# Patient Record
Sex: MALE | URBAN - METROPOLITAN AREA
[De-identification: names, ages, dates, MRNs, and addresses within clinical notes are randomized per-mention and may not be internally consistent; named-entity substitution may affect disease eponyms.]

---

## 2018-04-22 ENCOUNTER — HOSPITAL ENCOUNTER (EMERGENCY)
Facility: CLINIC | Age: 23
Discharge: HOME | End: 2018-04-22
Attending: EMERGENCY MEDICINE | Admitting: EMERGENCY MEDICINE
Payer: COMMERCIAL

## 2018-04-22 VITALS
DIASTOLIC BLOOD PRESSURE: 72 MMHG | OXYGEN SATURATION: 98 % | HEIGHT: 65 IN | SYSTOLIC BLOOD PRESSURE: 152 MMHG | BODY MASS INDEX: 30.32 KG/M2 | WEIGHT: 182 LBS | TEMPERATURE: 99.5 F | RESPIRATION RATE: 16 BRPM

## 2018-04-22 DIAGNOSIS — L05.02 PILONIDAL SINUS WITH ABSCESS: ICD-10-CM

## 2018-04-22 PROCEDURE — 99284 EMERGENCY DEPT VISIT MOD MDM: CPT | Mod: 25

## 2018-04-22 PROCEDURE — 87070 CULTURE OTHR SPECIMN AEROBIC: CPT | Performed by: EMERGENCY MEDICINE

## 2018-04-22 PROCEDURE — 10080 I&D PILONIDAL CYST SIMPLE: CPT

## 2018-04-22 PROCEDURE — 76857 US EXAM PELVIC LIMITED: CPT

## 2018-04-22 RX ORDER — CLINDAMYCIN HCL 300 MG
300 CAPSULE ORAL 4 TIMES DAILY
Qty: 40 CAPSULE | Refills: 0 | Status: SHIPPED | OUTPATIENT
Start: 2018-04-22 | End: 2018-05-02

## 2018-04-22 RX ORDER — IBUPROFEN 600 MG/1
600 TABLET, FILM COATED ORAL EVERY 6 HOURS PRN
Qty: 60 TABLET | Refills: 0 | Status: ON HOLD | OUTPATIENT
Start: 2018-04-22 | End: 2018-05-09

## 2018-04-22 RX ORDER — ETHYL CHLORIDE 100 %
1 AEROSOL, SPRAY (ML) TOPICAL ONCE
Status: DISCONTINUED | OUTPATIENT
Start: 2018-04-22 | End: 2018-04-22 | Stop reason: HOSPADM

## 2018-04-22 ASSESSMENT — ENCOUNTER SYMPTOMS
WOUND: 1
COLOR CHANGE: 0

## 2018-04-22 NOTE — ED AVS SNAPSHOT
Emergency Department    [address] [facility] [address]    Phone:  [phone]    Fax:  [phone]                                       Marilyn Perez   MRN: [id number]    Department:   Emergency Department   Date of Visit:  4/22/2018           Patient Information     Date Of Birth          1995        Your diagnoses for this visit were:     Pilonidal sinus with abscess        You were seen by Nhi Veras MD.      Follow-up Information     Follow up with [facility]. Schedule an appointment as soon as possible for a visit in 1 week.    Contact information:    [address]  [facility] [address] [phone]        Discharge Instructions       *You may resume diet and activities.  *Sitz baths several times daily. Take medications as prescribed.  Ibuprofen and/or tylenol for pain.  Clindamycin only if surrounding redness or warmth begins and spreads. Continue your current medications  *Follow-up with General Surgery for a recheck and for possible surgery to remove the pilonidal cyst and sinus.  *Return if you develop fever, spreading redness or warmth, faint or feel like you will faint or become worse in any way.      Infected Pilonidal Cyst (Incision & Drainage)  A pilonidal cyst is a swelling that starts under the skin on the sacrum near the tail bone. It may look like a small dimple. It can fill with skin oils, hair, and dead skin cells. It may stay small or grow larger. Because it often has an opening to the surface, it may become infected with normal skin bacteria.  Cause  The cause of pilonidal cysts has been debated since they were first recognized. It may be present at birth and go unnoticed. Injury, rubbing, or skin irritation may also cause pilonidal cysts. It can also be caused by an ingrown hair. Most likely, the cause is a combination of these things. Because some injury or irritation can lead to pilonidal cysts, it can be more  common in people who sit or drive a lot for work.  Symptoms  A pilonidal cyst may be small and painless. If it is inflamed or infected, you may have these symptoms:    Swelling    Irritation or redness    Pain    Drainage  The cyst can swell and drain on its own. The swelling and drainage can come and go.  Treatment  Your pilonidal cyst was drained with a small incision using local anesthesia.  After the incision and drainage, gauze packing may be inserted into the opening. If so, it should be removed in 1 to 2 days. Antibiotics are not required in the treatment of a simple abscess, unless the infection is spreading into the skin around the wound. The wound will take about 1 to 2 weeks to heal depending on the size of the cyst.  Home care  Wound care    Pus may drain from the wound for the first few days. Cover the wound with a clean dry bandage. Change the bandage if it becomes soaked with blood or pus, or if it gets soiled with feces or urine.    Sit in a tub filled with about 6 inches of hot water. Keep the water hot for 10 to15 minutes.    If gauze packing was placed inside the cyst cavity, you may be told to remove it yourself. You may do this in the shower. Once the packing is removed, you should wash the area carefully in the shower once a day. Do this until the skin opening has closed. It is okay to direct the shower spray directly into the opening if this is not too painful.  Medicines    Take acetaminophen or ibuprofen for pain, unless you were given a different pain medicine to use. Talk with your doctor before using these medicines if you have chronic liver or kidney disease or have ever had a stomach ulcer or gastrointestinal bleeding. Also talk with your doctor if you are taking blood thinner medicines.    If you were given antibiotics, take them until they are gone. It is important to finish the antibiotics even if the wound looks better. This is to make sure the infection has cleared.    Use  antibiotic cream or ointment if your healthcare provider tells you to do so.  Prevention  Once this infection has healed, the following may decrease the risk of future infections:    Keep the area of the cyst clean by bathing or showering daily.    Avoid tight-fitting clothing to minimize perspiration and irritation of the skin.    Recurrent pilonidal cysts may be completely removed by surgery. But this can only be done at a time when there is no infection. Ask your doctor for more information.  Follow-up care  Follow up with your healthcare provider, or as advised. If a gauze packing was inserted in your wound, it should be removed in 1 to 2 days. Check your wound every day for the signs listed below.  When to seek medical advice  Call your healthcare provider right away if any of these occur:    Pus continues to come from the cyst for 5 days after the incision    Increasing redness, local pain, or swelling    Fever of 100.4 F (38.0 C) or higher for more than 2 days, or as directed by your healthcare provider  Date Last Reviewed: 12/1/2016 2000-2017 [employer]. [address] [place] [address]. All rights reserved. This information is not intended as a substitute for professional medical care. Always follow your healthcare professional's instructions.        Discharge Instructions  Boils or Abscesses, MRSA Skin infections    You have been treated today for a skin boil or abscess. A boil is an infection under the skin that causes a painful pus filled lump. Boils start when bacteria infect a hair follicle, the place where a hair starts to grow.  The most common places that boils develop are on the face, neck, armpits, breasts, groin and buttocks. When they are small they can often be treated at home, but they can grow quickly, become very painful, and require medical attention.    Many of these infections are staph infections. Staph is a type of bacteria that commonly lives on skin. As many  as 1 out of 3 people have staph that lives on their skin. Usually, it causes no problems, but if there is a cut or scrape, it can cause an infection. You have been treated today for an infection thought to be caused by MRSA, (pronounced  mursa ) which stands for methicillin resistant staph aureus. MRSA can be very difficult to treat. The antibiotics that were once used for skin infections do not work on MRSA, so alternative medications must be prescribed.    Return to the Emergency Department if:    Your redness, pain, or swelling gets a lot worse.    You are unable to get your antibiotics, or are vomiting them up or you can t take them.    You are feeling more ill, weak or lightheaded.    You start to run a new fever (temperature >101).    Anything else about the infection worries or concerns you.  Treatment:    Incision and drainage (opening the boil with a scalpel to help the pus drain) or needle aspiration (removing pus with a syringe) is sometimes needed for larger abscesses. A wick or packing is sometimes put in the wound to encourage ongoing drainage of infection from the area.  Please leave it in place for as long as instructed by your care provider or until your follow up wound check in 48 hours.    Start your antibiotics right away, and take them as prescribed. Be sure to finish the whole prescription, even if you are better.    Apply a heating pad, warm packs, or warm water soaks to the infected area for 15 minutes at a time, at least 3 times a day. Do not use a heating pad on your feet or legs if you have diabetes. Do not sleep with a heating pad on, since this can cause burns or skin injury.    Raise the affected area above the level of your heart as much as possible in the first 1-2 days.    Pain medication - You may take a pain medication such as Tylenol  (acetaminophen), Advil  (ibuprofen), Nuprin  (ibuprofen) or Aleve  (naproxen).  If you have been given a narcotic such as Vicodin  (hydrocodone with  acetaminophen), Percocet  (oxycodone with acetaminophen), or codeine, do not drive for four hours after you have taken it. If the narcotic contains Tylenol  (acetaminophen), do not take Tylenol  with it. All narcotics will cause constipation, so eat a high fiber diet.              Information about MRSA    How do you catch MRSA?    By touching a person who has MRSA on his or her skin.    By being nearby when a person with MRSA breathes, coughs, or sneezes.    By touching a table, handle or other surface that has the germ on it.    If the germ is on your skin and you cut yourself or have another injury, you can get infected.    How do I know if I have a MRSA infection? MRSA most commonly causes skin infections such as boils, red tender lumps that contain pus. Your physician may recognize MRSA from the appearance of your infection. Sometimes, your doctor may swab your skin or the drainage from a boil to test for MRSA or other bacteria.    Can MRSA be treated? Yes, certain medications are still effective in treating MRSA infections.  It is very important that you follow the directions exactly. Take ALL the pills you are given, even if you feel better before you finish the pills. If you do not take them all, the germ could come back even stronger and be harder to treat next time.  Is there any way to prevent MRSA?     Wash your hands frequently with soap and water.    Do not share towels, washcloths, razors or other personal care items.    Wipe down gym equipment before and after you use it.    If you develop a similar infection in the future, you can try to treat it at home:    Apply warm compresses to the affected area to promote drainage.    Wash hands frequently to prevent spread of infection.    Keep affected area covered to prevent spread of infection.    Never squeeze or pop boils.     When to seek medical attention for boils:    You develop a fever.    The area around the boil becomes red or red streaks  "develop.    Your pain becomes severe.    You develop swollen lymph nodes.    You have diabetes, a heart murmur, an immune disease like HIV or AIDS, you take corticosteroids for a medical condition, or you are on chemotherapy.    You develop a boil or abscess on your face, near your spine or near your rectal opening.  Probiotics: If you have been given an antibiotic, you may want to also take a probiotic pill or eat yogurt with live cultures. Probiotics have \"good bacteria\" to help your intestines stay healthy. Studies have shown that probiotics help prevent diarrhea and other intestine problems (including C. diff infection) when you take antibiotics. You can buy these without a prescription in the pharmacy section of the store.   If you were given a prescription for medicine here today, be sure to read all of the information (including the package insert) that comes with your prescription.  This will include important information about the medicine, its side effects, and any warnings that you need to know about.  The pharmacist who fills the prescription can provide more information and answer questions you may have about the medicine.  If you have questions or concerns that the pharmacist cannot address, please call or return to the Emergency Department.   Opioid Medication Information    Pain medications are among the most commonly prescribed medicines, so we are including this information for all our patients. If you did not receive pain medication or get a prescription for pain medicine, you can ignore it.     You may have been given a prescription for an opioid (narcotic) pain medicine and/or have received a pain medicine while here in the Emergency Department. These medicines can make you drowsy or impaired. You must not drive, operate dangerous equipment, or engage in any other dangerous activities while taking these medications. If you drive while taking these medications, you could be arrested for DUI, or " driving under the influence. Do not drink any alcohol while you are taking these medications.     Opioid pain medications can cause addiction. If you have a history of chemical dependency of any type, you are at a higher risk of becoming addicted to pain medications.  Only take these prescribed medications to treat your pain when all other options have been tried. Take it for as short a time and as few doses as possible. Store your pain pills in a secure place, as they are frequently stolen and provide a dangerous opportunity for children or visitors in your house to start abusing these powerful medications. We will not replace any lost or stolen medicine.  As soon as your pain is better, you should flush all your remaining medication.     Many prescription pain medications contain Tylenol  (acetaminophen), including Vicodin , Tylenol #3 , Norco , Lortab , and Percocet .  You should not take any extra pills of Tylenol  if you are using these prescription medications or you can get very sick.  Do not ever take more than 3000 mg of acetaminophen in any 24 hour period.    All opioids tend to cause constipation. Drink plenty of water and eat foods that have a lot of fiber, such as fruits, vegetables, prune juice, apple juice and high fiber cereal.  Take a laxative if you don t move your bowels at least every other day. Miralax , Milk of Magnesia, Colace , or Senna  can be used to keep you regular.      Remember that you can always come back to the Emergency Department if you are not able to see your regular doctor in the amount of time listed above, if you get any new symptoms, or if there is anything that worries you.        24 Hour Appointment Hotline       To make an appointment at any [facility], call [phone] ([phone]). If you don't have a family doctor or clinic, we will help you find one. [facility] are conveniently located to serve the needs of you and your family.             Review of  your medicines      START taking        Dose / Directions Last dose taken    clindamycin 300 MG capsule   Commonly known as:  CLEOCIN   Dose:  300 mg   Quantity:  40 capsule        Take 1 capsule (300 mg) by mouth 4 times daily for 10 days   Refills:  0        ibuprofen 600 MG tablet   Commonly known as:  ADVIL/MOTRIN   Dose:  600 mg   Quantity:  60 tablet        Take 1 tablet (600 mg) by mouth every 6 hours as needed for moderate pain   Refills:  0                Prescriptions were sent or printed at these locations (2 Prescriptions)                   Other Prescriptions                Printed at Department/Unit printer (2 of 2)         clindamycin (CLEOCIN) 300 MG capsule               ibuprofen (ADVIL/MOTRIN) 600 MG tablet                Procedures and tests performed during your visit     POC US SOFT TISSUE      Orders Needing Specimen Collection     None      Pending Results     No orders found from 4/20/2018 to 4/23/2018.            Pending Culture Results     No orders found from 4/20/2018 to 4/23/2018.            Pending Results Instructions     If you had any lab results that were not finalized at the time of your Discharge, you can call the ED Lab Result RN at [phone]. You will be contacted by this team for any positive Lab results or changes in treatment. The nurses are available 7 days a week from 10A to 6:30P.  You can leave a message 24 hours per day and they will return your call.        Test Results From Your Hospital Stay        4/22/2018 11:40 AM      Impression      Emergency Medicine Limited Ultrasound Examination:    PROCEDURE: Soft tissue ultrasound  PERFORMED BY: Nhi Veras MD  INDICATIONS/SYMPTOM:  Soft tissue swelling  PROBE: High frequency linear probe  BODY LOCATION: just to left of top of gluteal cleft  FINDINGS: Small hypoechoic pocket under skin  INTERPRETATION:   +abscess  IMAGE DOCUMENTATION: Images were archived on US Machine.                  Clinical Quality Measure: Blood  Pressure Screening     Your blood pressure was checked while you were in the emergency department today. The last reading we obtained was  BP: 152/72 . Please read the guidelines below about what these numbers mean and what you should do about them.  If your systolic blood pressure (the top number) is less than 120 and your diastolic blood pressure (the bottom number) is less than 80, then your blood pressure is normal. There is nothing more that you need to do about it.  If your systolic blood pressure (the top number) is 120-139 or your diastolic blood pressure (the bottom number) is 80-89, your blood pressure may be higher than it should be. You should have your blood pressure rechecked within a year by a primary care provider.  If your systolic blood pressure (the top number) is 140 or greater or your diastolic blood pressure (the bottom number) is 90 or greater, you may have high blood pressure. High blood pressure is treatable, but if left untreated over time it can put you at risk for heart attack, stroke, or kidney failure. You should have your blood pressure rechecked by a primary care provider within the next 4 weeks.  If your provider in the emergency department today gave you specific instructions to follow-up with your doctor or provider even sooner than that, you should follow that instruction and not wait for up to 4 weeks for your follow-up visit.        Thank you for choosing [facility]       Thank you for choosing [facility] for your care. Our goal is always to provide you with excellent care. Hearing back from our patients is one way we can continue to improve our services. Please take a few minutes to complete the written survey that you may receive in the mail after you visit with us. Thank you!        [employer] Information     [employer] lets you send messages to your doctor, view your test results, renew your prescriptions, schedule appointments and more. To sign up, go to [email or web address] .  "Click on \"Log in\" on the left side of the screen, which will take you to the Welcome page. Then click on \"Sign up Now\" on the right side of the page.     You will be asked to enter the access code listed below, as well as some personal information. Please follow the directions to create your username and password.     Your access code is: [email or web address]  Expires: 2018  2:03 PM     Your access code will  in 90 days. If you need help or a new code, please call your [place] clinic or [phone].        Care EveryWhere ID     This is your Care EveryWhere ID. This could be used by other organizations to access your [place] medical records  [id number]        Equal Access to Services     [facility] : Jerry Bernal, waamos chase, qalisha kaalcali marsh, bernice bell. So [facility] [phone].    ATENCIÓN: Si habla español, tiene a zavala disposición servicios gratuitos de asistencia lingüística. Llame al [phone].    We comply with applicable federal civil rights laws and [place] laws. We do not discriminate on the basis of race, color, national origin, age, disability, sex, sexual orientation, or gender identity.            After Visit Summary       This is your record. Keep this with you and show to your community pharmacist(s) and doctor(s) at your next visit.                  "

## 2018-04-22 NOTE — ED AVS SNAPSHOT
Emergency Department    [address] [place] [address]    Phone:  [phone]    Fax:  [phone]                                       Marilyn Perez   MRN: [id number]    Department:   Emergency Department   Date of Visit:  4/22/2018           After Visit Summary Signature Page     I have received my discharge instructions, and my questions have been answered. I have discussed any challenges I see with this plan with the nurse or doctor.    ..........................................................................................................................................  Patient/Patient Representative Signature      ..........................................................................................................................................  Patient Representative Print Name and Relationship to Patient    ..................................................               ................................................  Date                                            Time    ..........................................................................................................................................  Reviewed by Signature/Title    ...................................................              ..............................................  Date                                                            Time

## 2018-04-22 NOTE — ED PROVIDER NOTES
"  History     Chief Complaint:  Wound Check    HPI   Marilyn Perez is an otherwise healthy 23 year old male who presents to the Emergency Department for evaluation of wound check. The patient noted swelling to his right buttock/tailbone with associated chills and pus-like drainage. His wife and mom have been expressing a large amount of material over the past few days. Upon presentation he reports a similar abscess four years ago and mother thinks it may be secondary to stress. Of note, the patient is under a recent stress as he is visiting his mom from [place] after a recent death of his 7 year old son in a motorcycle accident. He denies any rash, color change or any other associated symptoms. He and his mother state that he had a similar problem approximately 4 years ago and underwent incision and drainage.  Mom notes that she has been squeezing out pus and debris and even noted hair coming out.  No surrounding redness.  No vomiting or diarrhea.     Allergies:  No known drug allergies    Medications:    The patient is not currently taking any prescribed medications.    Past Medical History:    The patient denies any significant past medical history.    Past Surgical History:    The patient does not have any pertinent past surgical history.    Family History:    No past pertinent family history.    Social History:  The patient was accompanied to the ED by wife and mother.  Smoking Status: current some day smoker  Smokeless Tobacco: never used  Alcohol Use: no     Review of Systems   Skin: Positive for wound. Negative for color change and rash.   All other systems reviewed and are negative.      Physical Exam   First Vitals:  BP: 152/72  Heart Rate: 71  Temp: 99.5  F (37.5  C)  Resp: 16  Height: 165.1 cm (5' 5\")  Weight: 82.6 kg (182 lb)  SpO2: 98 %    Physical Exam  General: Well-nourished, appears to be in pain  Eyes: PERRL, conjunctivae pink no scleral icterus or conjunctival injection  ENT:  Moist mucus " membranes  Respiratory:  No respiratory distress  CV: Normal rate   Skin: Small area of induration on left buttocks just lateral to the superior aspect of gluteal cleft.  Tender.  Not draining and appears scarred and closed.  No surrounding cellulitis.  Tiny pit at the superior aspect of the gluteal cleft without evidence of abscess or cellulitis, no drainage at this time.  Musculoskeletal: No peripheral edema or calf tenderness  Neuro: Alert and oriented to person/place/time  Psychiatric: Normal affect      Emergency Department Course   Procedures:  POC US Soft Tissue:   Emergency Medicine Limited Ultrasound Examination:    PROCEDURE: Soft tissue ultrasound  PERFORMED BY: Nhi Veras MD  INDICATIONS/SYMPTOM:  Soft tissue swelling  PROBE: High frequency linear probe  BODY LOCATION: just to left of top of gluteal cleft  FINDINGS: Small hypoechoic pocket under skin  INTERPRETATION:   +small abscess  IMAGE DOCUMENTATION: Images were archived on US Machine.      Procedure: Incision and Drainage   LOCATIONS:  Left buttocks to left of superior aspect of gluteal cleft     ANESTHESIA:  Local field block using Jtip lidocaine 1% and ethyl chloride spray     PREPARATION:  Cleansed with Betadine     PROCEDURE:  Area was incised with # 11 Blade ([place]) with a Single Straight incision.  Wound treatment included Purulent Drainage and Expression of Material.  Packing consisted of No Packing.  Appropriate dressing was applied to cover the area.    Patient Status:        Patient tolerated the procedure moderately well. There were no complications.    Laboratory:  Wound culture Aerobic Bacterial: pending    Emergency Department Course:  Past medical records, nursing notes, and vitals reviewed.   I performed an exam of the patient as documented above.   The above imaging study was obtained. Results above.  I rechecked patient.  I&D performed as above.   I discussed the treatment plan with the patient. He expressed understanding of  this plan and consented to discharge. He will be discharged home with instructions for care and follow up. In addition, the patient will return to the emergency department if symptoms persist, worsen, if new symptoms arise or if there is any concern.  All questions were answered.      Impression & Plan    Medical Decision Making:  Marilyn Perez is a 23 year old male who presents with pain in his buttocks and what appears to be a pilonidal cyst.  It's tiny but given that I could appreciate fluid on bedside US and it seems recurrent, I did perform I&D.  No signs of cellulitis and no signs of serious infection or necrotizing fascitis.  I explained to the patient and his family that he needs to soak in sitz baths or showers to promote drainage.  I also explained that he likely needs to see a surgeon in follow-up as he appears to have sinus tracts and chronic pilonidal disease.  Plan home surgery or may return to ED for wound check.  They did request antibiotics and I explained that they are not indicated or recommended at this point but did provide a prescription in the pocket given he is visiting from out of town. Warning signs for wound given on discharge instructions and verbally; see d/c instructions.  I offered my condolences to the patient and his wife on the loss of their son.    Diagnosis:    ICD-10-CM    1. Pilonidal sinus with abscess L05.02        Disposition:   discharged to home    Discharge Medications:  New Prescriptions    CLINDAMYCIN (CLEOCIN) 300 MG CAPSULE    Take 1 capsule (300 mg) by mouth 4 times daily for 10 days    IBUPROFEN (ADVIL/MOTRIN) 600 MG TABLET    Take 1 tablet (600 mg) by mouth every 6 hours as needed for moderate pain       Scribe Disclosure:  I, Haseeb Denise, am serving as a scribe at 1:47 PM on 4/22/2018 to document services personally performed by Nhi Veras MD based on my observations and the provider's statements to me.    Haseeb Denise  4/22/2018   EMERGENCY DEPARTMENT          Nhi Veras MD  04/22/18 [id number]

## 2018-04-22 NOTE — DISCHARGE INSTRUCTIONS
*You may resume diet and activities.  *Sitz baths several times daily. Take medications as prescribed.  Ibuprofen and/or tylenol for pain.  Clindamycin only if surrounding redness or warmth begins and spreads. Continue your current medications  *Follow-up with General Surgery for a recheck and for possible surgery to remove the pilonidal cyst and sinus.  *Return if you develop fever, spreading redness or warmth, faint or feel like you will faint or become worse in any way.      Infected Pilonidal Cyst (Incision & Drainage)  A pilonidal cyst is a swelling that starts under the skin on the sacrum near the tail bone. It may look like a small dimple. It can fill with skin oils, hair, and dead skin cells. It may stay small or grow larger. Because it often has an opening to the surface, it may become infected with normal skin bacteria.  Cause  The cause of pilonidal cysts has been debated since they were first recognized. It may be present at birth and go unnoticed. Injury, rubbing, or skin irritation may also cause pilonidal cysts. It can also be caused by an ingrown hair. Most likely, the cause is a combination of these things. Because some injury or irritation can lead to pilonidal cysts, it can be more common in people who sit or drive a lot for work.  Symptoms  A pilonidal cyst may be small and painless. If it is inflamed or infected, you may have these symptoms:    Swelling    Irritation or redness    Pain    Drainage  The cyst can swell and drain on its own. The swelling and drainage can come and go.  Treatment  Your pilonidal cyst was drained with a small incision using local anesthesia.  After the incision and drainage, gauze packing may be inserted into the opening. If so, it should be removed in 1 to 2 days. Antibiotics are not required in the treatment of a simple abscess, unless the infection is spreading into the skin around the wound. The wound will take about 1 to 2 weeks to heal depending on the size of  the cyst.  Home care  Wound care    Pus may drain from the wound for the first few days. Cover the wound with a clean dry bandage. Change the bandage if it becomes soaked with blood or pus, or if it gets soiled with feces or urine.    Sit in a tub filled with about 6 inches of hot water. Keep the water hot for 10 to15 minutes.    If gauze packing was placed inside the cyst cavity, you may be told to remove it yourself. You may do this in the shower. Once the packing is removed, you should wash the area carefully in the shower once a day. Do this until the skin opening has closed. It is okay to direct the shower spray directly into the opening if this is not too painful.  Medicines    Take acetaminophen or ibuprofen for pain, unless you were given a different pain medicine to use. Talk with your doctor before using these medicines if you have chronic liver or kidney disease or have ever had a stomach ulcer or gastrointestinal bleeding. Also talk with your doctor if you are taking blood thinner medicines.    If you were given antibiotics, take them until they are gone. It is important to finish the antibiotics even if the wound looks better. This is to make sure the infection has cleared.    Use antibiotic cream or ointment if your healthcare provider tells you to do so.  Prevention  Once this infection has healed, the following may decrease the risk of future infections:    Keep the area of the cyst clean by bathing or showering daily.    Avoid tight-fitting clothing to minimize perspiration and irritation of the skin.    Recurrent pilonidal cysts may be completely removed by surgery. But this can only be done at a time when there is no infection. Ask your doctor for more information.  Follow-up care  Follow up with your healthcare provider, or as advised. If a gauze packing was inserted in your wound, it should be removed in 1 to 2 days. Check your wound every day for the signs listed below.  When to seek medical  advice  Call your healthcare provider right away if any of these occur:    Pus continues to come from the cyst for 5 days after the incision    Increasing redness, local pain, or swelling    Fever of 100.4 F (38.0 C) or higher for more than 2 days, or as directed by your healthcare provider  Date Last Reviewed: 12/1/2016 2000-2017 [employer]. [address] [place] [address]. All rights reserved. This information is not intended as a substitute for professional medical care. Always follow your healthcare professional's instructions.        Discharge Instructions  Boils or Abscesses, MRSA Skin infections    You have been treated today for a skin boil or abscess. A boil is an infection under the skin that causes a painful pus filled lump. Boils start when bacteria infect a hair follicle, the place where a hair starts to grow.  The most common places that boils develop are on the face, neck, armpits, breasts, groin and buttocks. When they are small they can often be treated at home, but they can grow quickly, become very painful, and require medical attention.    Many of these infections are staph infections. Staph is a type of bacteria that commonly lives on skin. As many as 1 out of 3 people have staph that lives on their skin. Usually, it causes no problems, but if there is a cut or scrape, it can cause an infection. You have been treated today for an infection thought to be caused by MRSA, (pronounced  mursa ) which stands for methicillin resistant staph aureus. MRSA can be very difficult to treat. The antibiotics that were once used for skin infections do not work on MRSA, so alternative medications must be prescribed.    Return to the Emergency Department if:    Your redness, pain, or swelling gets a lot worse.    You are unable to get your antibiotics, or are vomiting them up or you can t take them.    You are feeling more ill, weak or lightheaded.    You start to run a new fever  (temperature >101).    Anything else about the infection worries or concerns you.  Treatment:    Incision and drainage (opening the boil with a scalpel to help the pus drain) or needle aspiration (removing pus with a syringe) is sometimes needed for larger abscesses. A wick or packing is sometimes put in the wound to encourage ongoing drainage of infection from the area.  Please leave it in place for as long as instructed by your care provider or until your follow up wound check in 48 hours.    Start your antibiotics right away, and take them as prescribed. Be sure to finish the whole prescription, even if you are better.    Apply a heating pad, warm packs, or warm water soaks to the infected area for 15 minutes at a time, at least 3 times a day. Do not use a heating pad on your feet or legs if you have diabetes. Do not sleep with a heating pad on, since this can cause burns or skin injury.    Raise the affected area above the level of your heart as much as possible in the first 1-2 days.    Pain medication - You may take a pain medication such as Tylenol  (acetaminophen), Advil  (ibuprofen), Nuprin  (ibuprofen) or Aleve  (naproxen).  If you have been given a narcotic such as Vicodin  (hydrocodone with acetaminophen), Percocet  (oxycodone with acetaminophen), or codeine, do not drive for four hours after you have taken it. If the narcotic contains Tylenol  (acetaminophen), do not take Tylenol  with it. All narcotics will cause constipation, so eat a high fiber diet.              Information about MRSA    How do you catch MRSA?    By touching a person who has MRSA on his or her skin.    By being nearby when a person with MRSA breathes, coughs, or sneezes.    By touching a table, handle or other surface that has the germ on it.    If the germ is on your skin and you cut yourself or have another injury, you can get infected.    How do I know if I have a MRSA infection? MRSA most commonly causes skin infections such as  "boils, red tender lumps that contain pus. Your physician may recognize MRSA from the appearance of your infection. Sometimes, your doctor may swab your skin or the drainage from a boil to test for MRSA or other bacteria.    Can MRSA be treated? Yes, certain medications are still effective in treating MRSA infections.  It is very important that you follow the directions exactly. Take ALL the pills you are given, even if you feel better before you finish the pills. If you do not take them all, the germ could come back even stronger and be harder to treat next time.  Is there any way to prevent MRSA?     Wash your hands frequently with soap and water.    Do not share towels, washcloths, razors or other personal care items.    Wipe down gym equipment before and after you use it.    If you develop a similar infection in the future, you can try to treat it at home:    Apply warm compresses to the affected area to promote drainage.    Wash hands frequently to prevent spread of infection.    Keep affected area covered to prevent spread of infection.    Never squeeze or pop boils.     When to seek medical attention for boils:    You develop a fever.    The area around the boil becomes red or red streaks develop.    Your pain becomes severe.    You develop swollen lymph nodes.    You have diabetes, a heart murmur, an immune disease like HIV or AIDS, you take corticosteroids for a medical condition, or you are on chemotherapy.    You develop a boil or abscess on your face, near your spine or near your rectal opening.  Probiotics: If you have been given an antibiotic, you may want to also take a probiotic pill or eat yogurt with live cultures. Probiotics have \"good bacteria\" to help your intestines stay healthy. Studies have shown that probiotics help prevent diarrhea and other intestine problems (including C. diff infection) when you take antibiotics. You can buy these without a prescription in the pharmacy section of the " store.   If you were given a prescription for medicine here today, be sure to read all of the information (including the package insert) that comes with your prescription.  This will include important information about the medicine, its side effects, and any warnings that you need to know about.  The pharmacist who fills the prescription can provide more information and answer questions you may have about the medicine.  If you have questions or concerns that the pharmacist cannot address, please call or return to the Emergency Department.   Opioid Medication Information    Pain medications are among the most commonly prescribed medicines, so we are including this information for all our patients. If you did not receive pain medication or get a prescription for pain medicine, you can ignore it.     You may have been given a prescription for an opioid (narcotic) pain medicine and/or have received a pain medicine while here in the Emergency Department. These medicines can make you drowsy or impaired. You must not drive, operate dangerous equipment, or engage in any other dangerous activities while taking these medications. If you drive while taking these medications, you could be arrested for DUI, or driving under the influence. Do not drink any alcohol while you are taking these medications.     Opioid pain medications can cause addiction. If you have a history of chemical dependency of any type, you are at a higher risk of becoming addicted to pain medications.  Only take these prescribed medications to treat your pain when all other options have been tried. Take it for as short a time and as few doses as possible. Store your pain pills in a secure place, as they are frequently stolen and provide a dangerous opportunity for children or visitors in your house to start abusing these powerful medications. We will not replace any lost or stolen medicine.  As soon as your pain is better, you should flush all your  remaining medication.     Many prescription pain medications contain Tylenol  (acetaminophen), including Vicodin , Tylenol #3 , Norco , Lortab , and Percocet .  You should not take any extra pills of Tylenol  if you are using these prescription medications or you can get very sick.  Do not ever take more than 3000 mg of acetaminophen in any 24 hour period.    All opioids tend to cause constipation. Drink plenty of water and eat foods that have a lot of fiber, such as fruits, vegetables, prune juice, apple juice and high fiber cereal.  Take a laxative if you don t move your bowels at least every other day. Miralax , Milk of Magnesia, Colace , or Senna  can be used to keep you regular.      Remember that you can always come back to the Emergency Department if you are not able to see your regular doctor in the amount of time listed above, if you get any new symptoms, or if there is anything that worries you.

## 2018-04-24 LAB
BACTERIA SPEC CULT: NORMAL
SPECIMEN SOURCE: NORMAL

## 2018-04-26 ENCOUNTER — OFFICE VISIT (OUTPATIENT)
Dept: SURGERY | Facility: CLINIC | Age: 23
End: 2018-04-26
Payer: COMMERCIAL

## 2018-04-26 VITALS
WEIGHT: 184 LBS | DIASTOLIC BLOOD PRESSURE: 80 MMHG | HEART RATE: 76 BPM | HEIGHT: 69 IN | SYSTOLIC BLOOD PRESSURE: 120 MMHG | BODY MASS INDEX: 27.25 KG/M2

## 2018-04-26 DIAGNOSIS — L05.01 PILONIDAL ABSCESS: Primary | ICD-10-CM

## 2018-04-26 PROCEDURE — 99203 OFFICE O/P NEW LOW 30 MIN: CPT | Performed by: SURGERY

## 2018-04-26 RX ORDER — CEPHALEXIN 500 MG/1
500 CAPSULE ORAL 3 TIMES DAILY
Qty: 30 CAPSULE | Refills: 0 | Status: SHIPPED | OUTPATIENT
Start: 2018-04-26 | End: 2018-05-17

## 2018-04-26 NOTE — PROGRESS NOTES
"[facility]  Surgery Consultation    HPI:Patient is a 23 year old male who is here for consultation requested by No Ref-Primary, Physician None for evaluation of recurring infection of a pilonidal cyst. The patient has had a pilonidal cyst for approximately 4 years. He has recurring infections that are not adequately treated with incision and drainage or antibiotics. His last flareup brought him to the emergency room where they did a small incision which has helped decrease the inflammation. Even when he is not having infections he has significant pain in his tailbone area. He denies fevers or chills. He denies other complaints today. He has no issues with bowel movements..Patient denies fevers, chills, nausea, vomiting, SOB, chest pain, abdominal pain.    PMH:  None  PSH:   None  Social History:    reports that he has been smoking.  He has never used smokeless tobacco. He reports that he does not drink alcohol or use illicit drugs.  Family History:   family history includes DIABETES in his father; Hypertension in his father and mother.  Medications/Allergies: Home medications and allergies reviewed.    ROS:  The 10 point Review of Systems is negative other than noted in the HPI.    Physical Exam:  /80  Pulse 76  Ht 5' 8.5\" (1.74 m)  Wt 184 lb (83.5 kg)  BMI 27.57 kg/m2  General: Generally appears well.  Eyes- Sclera Clear- No icterus  Respiratory- Chest rise equal Bilateral  Buttock-multiple midline clefts. There are 2 clefts from a previous incision and drainage site that do not appear acutely inflamed. No erythema.    Impression and Plan:  Patient is a 23 year old male with very symptomatic recurrent pilonidal cyst    PLAN:   I have discussed the pathophysiology and treatment of pilonidal cyst with the patient. He has had long-term issues with infection and significant pain from recurrent pilonidal cyst. He has had multiple I&D's and courses of antibiotics but continues to have " significant discomfort despite these treatments. He does not have an active infection at this time to require urgent incision and drainage but I would recommend going forward with pilonidal cleft cyst lift to remove his disease and prevent recurrence. He should complete a 2 week course of antibiotics prior to surgery. He will return if any active inflammation.      Thank you very much for this consult.    Raffi Caraballo M.D.  [facility]  [phone]    Please route or send letter to:  Primary Care Provider (PCP) and Referring Provider

## 2018-04-26 NOTE — MR AVS SNAPSHOT
After Visit Summary   4/26/2018    Marilyn Perez    MRN: [id number]           Patient Information     Date Of Birth          1995        Visit Information        Provider Department      4/26/2018 8:45 AM Raffi Caraballo MD [facility] General Surgery      Today's Diagnoses     Pilonidal abscess    -  1      Care Instructions    Your surgery is scheduled for 5/9/18 9:30 am at [facility]          Follow-ups after your visit        Your next 10 appointments already scheduled     May 09, 2018   Procedure with Raffi Caraballo MD   [facility] PeriOP Services (--)    [address]  [facility] [id number] [phone]            May 09, 2018  9:30 AM CDT   [facility] Same Day Surgery with Raffi Caraballo MD   [facility] ([facility])    [facility] ([facility])   [phone]              Who to contact     If you have questions or need follow up information about today's clinic visit or your schedule please contact [facility] directly at [phone].  Normal or non-critical lab and imaging results will be communicated to you by MyChart, letter or phone within 4 business days after the clinic has received the results. If you do not hear from us within 7 days, please contact the clinic through [employer]hart or phone. If you have a critical or abnormal lab result, we will notify you by phone as soon as possible.  Submit refill requests through [employer] or call your pharmacy and they will forward the refill request to us. Please allow 3 business days for your refill to be completed.          Additional Information About Your Visit        [employer]har[employer]lePeer Information     [employer] lets you send messages to your doctor, view your test results, renew your prescriptions, schedule appointments and more. To sign up, go to  [email or web address] . Click on \"Log in\" on the left side of the screen, which will take you to the Welcome page. Then click on \"Sign up Now\" on the right side of the page.     You will be asked to enter the access code listed below, as well as some personal information. Please follow the directions to create your username and password.     Your access code is: [email or web address]  Expires: 2018  2:03 PM     Your access code will  in 90 days. If you need help or a new code, please call your [place] clinic or [phone].        Care EveryWhere ID     This is your Care EveryWhere ID. This could be used by other organizations to access your [place] medical records  [id number]        Your Vitals Were     Pulse Height BMI (Body Mass Index)             76 5' 8.5\" (1.74 m) 27.57 kg/m2          Blood Pressure from Last 3 Encounters:   18 120/80   18 152/72    Weight from Last 3 Encounters:   18 184 lb (83.5 kg)   18 182 lb (82.6 kg)              Today, you had the following     No orders found for display         Today's Medication Changes          These changes are accurate as of 18  9:30 AM.  If you have any questions, ask your nurse or doctor.               Start taking these medicines.        Dose/Directions    cephALEXin 500 MG capsule   Commonly known as:  KEFLEX   Used for:  Pilonidal abscess   Started by:  Raffi Caraballo MD        Dose:  500 mg   Take 1 capsule (500 mg) by mouth 3 times daily   Quantity:  30 capsule   Refills:  0            Where to get your medicines      Some of these will need a paper prescription and others can be bought over the counter.  Ask your nurse if you have questions.     Bring a paper prescription for each of these medications     cephALEXin 500 MG capsule                Primary Care Provider Fax #    Physician No Ref-Primary [phone]       No address on file        Equal Access to Services     VA BURTON AH: Jerry rader " Gabe, venancio dewittadaha, marciota kaian marsh, bernice conradoin hayaan melissachalino emilycarmen laMileynathan ray. So [facility] [phone].    ATENCIÓN: Si lex peña, tiene a zavala disposición servicios gratuitos de asistencia lingüística. Chelsy al [phone].    We comply with applicable federal civil rights laws and [place] laws. We do not discriminate on the basis of race, color, national origin, age, disability, sex, sexual orientation, or gender identity.            Thank you!     Thank you for choosing [facility]  for your care. Our goal is always to provide you with excellent care. Hearing back from our patients is one way we can continue to improve our services. Please take a few minutes to complete the written survey that you may receive in the mail after your visit with us. Thank you!             Your Updated Medication List - Protect others around you: Learn how to safely use, store and throw away your medicines at www.disposemymeds.org.          This list is accurate as of 4/26/18  9:30 AM.  Always use your most recent med list.                   Brand Name Dispense Instructions for use Diagnosis    cephALEXin 500 MG capsule    KEFLEX    30 capsule    Take 1 capsule (500 mg) by mouth 3 times daily    Pilonidal abscess       clindamycin 300 MG capsule    CLEOCIN    40 capsule    Take 1 capsule (300 mg) by mouth 4 times daily for 10 days        ibuprofen 600 MG tablet    ADVIL/MOTRIN    60 tablet    Take 1 tablet (600 mg) by mouth every 6 hours as needed for moderate pain

## 2018-04-27 ENCOUNTER — TELEPHONE (OUTPATIENT)
Dept: SURGERY | Facility: CLINIC | Age: 23
End: 2018-04-27

## 2018-04-27 NOTE — TELEPHONE ENCOUNTER
Type of surgery: pilonidal cleft lift  Location of surgery: [facility]  Date and time of surgery: 05/09/18 9:30 am  Surgeon: Dr Caraballo  Pre-Op Appt Date: pt to schedule  Post-Op Appt Date: pt to schedule   Packet sent out: Yes  Pre-cert/Authorization completed:  Not Applicable  Date: 04/26/18    General anesthesia

## 2018-05-07 NOTE — H&P (VIEW-ONLY)
"  History     Chief Complaint:  Wound Check    HPI   Marilyn Perez is an otherwise healthy 23 year old male who presents to the Emergency Department for evaluation of wound check. The patient noted swelling to his right buttock/tailbone with associated chills and pus-like drainage. His wife and mom have been expressing a large amount of material over the past few days. Upon presentation he reports a similar abscess four years ago and mother thinks it may be secondary to stress. Of note, the patient is under a recent stress as he is visiting his mom from [place] after a recent death of his 7 year old son in a motorcycle accident. He denies any rash, color change or any other associated symptoms. He and his mother state that he had a similar problem approximately 4 years ago and underwent incision and drainage.  Mom notes that she has been squeezing out pus and debris and even noted hair coming out.  No surrounding redness.  No vomiting or diarrhea.     Allergies:  No known drug allergies    Medications:    The patient is not currently taking any prescribed medications.    Past Medical History:    The patient denies any significant past medical history.    Past Surgical History:    The patient does not have any pertinent past surgical history.    Family History:    No past pertinent family history.    Social History:  The patient was accompanied to the ED by wife and mother.  Smoking Status: current some day smoker  Smokeless Tobacco: never used  Alcohol Use: no     Review of Systems   Skin: Positive for wound. Negative for color change and rash.   All other systems reviewed and are negative.      Physical Exam   First Vitals:  BP: 152/72  Heart Rate: 71  Temp: 99.5  F (37.5  C)  Resp: 16  Height: 165.1 cm (5' 5\")  Weight: 82.6 kg (182 lb)  SpO2: 98 %    Physical Exam  General: Well-nourished, appears to be in pain  Eyes: PERRL, conjunctivae pink no scleral icterus or conjunctival injection  ENT:  Moist mucus " membranes  Respiratory:  No respiratory distress  CV: Normal rate   Skin: Small area of induration on left buttocks just lateral to the superior aspect of gluteal cleft.  Tender.  Not draining and appears scarred and closed.  No surrounding cellulitis.  Tiny pit at the superior aspect of the gluteal cleft without evidence of abscess or cellulitis, no drainage at this time.  Musculoskeletal: No peripheral edema or calf tenderness  Neuro: Alert and oriented to person/place/time  Psychiatric: Normal affect      Emergency Department Course   Procedures:  POC US Soft Tissue:   Emergency Medicine Limited Ultrasound Examination:    PROCEDURE: Soft tissue ultrasound  PERFORMED BY: Nhi Veras MD  INDICATIONS/SYMPTOM:  Soft tissue swelling  PROBE: High frequency linear probe  BODY LOCATION: just to left of top of gluteal cleft  FINDINGS: Small hypoechoic pocket under skin  INTERPRETATION:   +small abscess  IMAGE DOCUMENTATION: Images were archived on US Machine.      Procedure: Incision and Drainage   LOCATIONS:  Left buttocks to left of superior aspect of gluteal cleft     ANESTHESIA:  Local field block using Jtip lidocaine 1% and ethyl chloride spray     PREPARATION:  Cleansed with Betadine     PROCEDURE:  Area was incised with # 11 Blade ([place]) with a Single Straight incision.  Wound treatment included Purulent Drainage and Expression of Material.  Packing consisted of No Packing.  Appropriate dressing was applied to cover the area.    Patient Status:        Patient tolerated the procedure moderately well. There were no complications.    Laboratory:  Wound culture Aerobic Bacterial: pending    Emergency Department Course:  Past medical records, nursing notes, and vitals reviewed.   I performed an exam of the patient as documented above.   The above imaging study was obtained. Results above.  I rechecked patient.  I&D performed as above.   I discussed the treatment plan with the patient. He expressed understanding of  this plan and consented to discharge. He will be discharged home with instructions for care and follow up. In addition, the patient will return to the emergency department if symptoms persist, worsen, if new symptoms arise or if there is any concern.  All questions were answered.      Impression & Plan    Medical Decision Making:  Marilyn Perez is a 23 year old male who presents with pain in his buttocks and what appears to be a pilonidal cyst.  It's tiny but given that I could appreciate fluid on bedside US and it seems recurrent, I did perform I&D.  No signs of cellulitis and no signs of serious infection or necrotizing fascitis.  I explained to the patient and his family that he needs to soak in sitz baths or showers to promote drainage.  I also explained that he likely needs to see a surgeon in follow-up as he appears to have sinus tracts and chronic pilonidal disease.  Plan home surgery or may return to ED for wound check.  They did request antibiotics and I explained that they are not indicated or recommended at this point but did provide a prescription in the pocket given he is visiting from out of town. Warning signs for wound given on discharge instructions and verbally; see d/c instructions.  I offered my condolences to the patient and his wife on the loss of their son.    Diagnosis:    ICD-10-CM    1. Pilonidal sinus with abscess L05.02        Disposition:   discharged to home    Discharge Medications:  New Prescriptions    CLINDAMYCIN (CLEOCIN) 300 MG CAPSULE    Take 1 capsule (300 mg) by mouth 4 times daily for 10 days    IBUPROFEN (ADVIL/MOTRIN) 600 MG TABLET    Take 1 tablet (600 mg) by mouth every 6 hours as needed for moderate pain       Scribe Disclosure:  I, Haseeb Denise, am serving as a scribe at 1:47 PM on 4/22/2018 to document services personally performed by Nhi Veras MD based on my observations and the provider's statements to me.    Haseeb Denise  4/22/2018   EMERGENCY DEPARTMENT          Nhi Veras MD  04/22/18 6261

## 2018-05-09 ENCOUNTER — SURGERY (OUTPATIENT)
Age: 23
End: 2018-05-09

## 2018-05-09 ENCOUNTER — ANESTHESIA EVENT (OUTPATIENT)
Dept: SURGERY | Facility: CLINIC | Age: 23
End: 2018-05-09
Payer: COMMERCIAL

## 2018-05-09 ENCOUNTER — OFFICE VISIT (OUTPATIENT)
Dept: SURGERY | Facility: PHYSICIAN GROUP | Age: 23
End: 2018-05-09
Payer: COMMERCIAL

## 2018-05-09 ENCOUNTER — HOSPITAL ENCOUNTER (OUTPATIENT)
Facility: CLINIC | Age: 23
Discharge: HOME | End: 2018-05-09
Attending: SURGERY | Admitting: SURGERY
Payer: COMMERCIAL

## 2018-05-09 ENCOUNTER — ANESTHESIA (OUTPATIENT)
Dept: SURGERY | Facility: CLINIC | Age: 23
End: 2018-05-09
Payer: COMMERCIAL

## 2018-05-09 VITALS
WEIGHT: 184.3 LBS | RESPIRATION RATE: 16 BRPM | DIASTOLIC BLOOD PRESSURE: 60 MMHG | BODY MASS INDEX: 27.3 KG/M2 | TEMPERATURE: 96.6 F | HEIGHT: 69 IN | SYSTOLIC BLOOD PRESSURE: 118 MMHG | OXYGEN SATURATION: 98 %

## 2018-05-09 DIAGNOSIS — L05.91 PILONIDAL CYST: Primary | ICD-10-CM

## 2018-05-09 PROCEDURE — 71000012 ZZH RECOVERY PHASE 1 LEVEL 1 FIRST HR: Performed by: SURGERY

## 2018-05-09 PROCEDURE — 88304 TISSUE EXAM BY PATHOLOGIST: CPT | Performed by: SURGERY

## 2018-05-09 PROCEDURE — 71000013 ZZH RECOVERY PHASE 1 LEVEL 1 EA ADDTL HR: Performed by: SURGERY

## 2018-05-09 PROCEDURE — 37000008 ZZH ANESTHESIA TECHNICAL FEE, 1ST 30 MIN: Performed by: SURGERY

## 2018-05-09 PROCEDURE — 11772 EXC PILONIDAL CYST COMP: CPT | Performed by: SURGERY

## 2018-05-09 PROCEDURE — 27210794 ZZH OR GENERAL SUPPLY STERILE: Performed by: SURGERY

## 2018-05-09 PROCEDURE — 36000050 ZZH SURGERY LEVEL 2 1ST 30 MIN: Performed by: SURGERY

## 2018-05-09 PROCEDURE — 88304 TISSUE EXAM BY PATHOLOGIST: CPT | Mod: 26 | Performed by: SURGERY

## 2018-05-09 PROCEDURE — 25000132 ZZH RX MED GY IP 250 OP 250 PS 637: Performed by: PHYSICIAN ASSISTANT

## 2018-05-09 PROCEDURE — 40000170 ZZH STATISTIC PRE-PROCEDURE ASSESSMENT II: Performed by: SURGERY

## 2018-05-09 PROCEDURE — 36000052 ZZH SURGERY LEVEL 2 EA 15 ADDTL MIN: Performed by: SURGERY

## 2018-05-09 PROCEDURE — 27210995 ZZH RX 272: Performed by: SURGERY

## 2018-05-09 PROCEDURE — 25000566 ZZH SEVOFLURANE, EA 15 MIN: Performed by: SURGERY

## 2018-05-09 PROCEDURE — 25000128 H RX IP 250 OP 636: Performed by: NURSE ANESTHETIST, CERTIFIED REGISTERED

## 2018-05-09 PROCEDURE — 25000125 ZZHC RX 250: Performed by: SURGERY

## 2018-05-09 PROCEDURE — 25000128 H RX IP 250 OP 636: Performed by: SURGERY

## 2018-05-09 PROCEDURE — 25000128 H RX IP 250 OP 636: Performed by: ANESTHESIOLOGY

## 2018-05-09 PROCEDURE — 37000009 ZZH ANESTHESIA TECHNICAL FEE, EACH ADDTL 15 MIN: Performed by: SURGERY

## 2018-05-09 PROCEDURE — 25000125 ZZHC RX 250: Performed by: NURSE ANESTHETIST, CERTIFIED REGISTERED

## 2018-05-09 PROCEDURE — 71000027 ZZH RECOVERY PHASE 2 EACH 15 MINS: Performed by: SURGERY

## 2018-05-09 RX ORDER — GINSENG 100 MG
CAPSULE ORAL PRN
Status: DISCONTINUED | OUTPATIENT
Start: 2018-05-09 | End: 2018-05-09 | Stop reason: HOSPADM

## 2018-05-09 RX ORDER — HYDROMORPHONE HYDROCHLORIDE 1 MG/ML
.3-.5 INJECTION, SOLUTION INTRAMUSCULAR; INTRAVENOUS; SUBCUTANEOUS EVERY 10 MIN PRN
Status: DISCONTINUED | OUTPATIENT
Start: 2018-05-09 | End: 2018-05-09 | Stop reason: HOSPADM

## 2018-05-09 RX ORDER — LIDOCAINE HYDROCHLORIDE 20 MG/ML
INJECTION, SOLUTION INFILTRATION; PERINEURAL PRN
Status: DISCONTINUED | OUTPATIENT
Start: 2018-05-09 | End: 2018-05-09

## 2018-05-09 RX ORDER — HYDROCODONE BITARTRATE AND ACETAMINOPHEN 5; 325 MG/1; MG/1
1-2 TABLET ORAL EVERY 4 HOURS PRN
Qty: 20 TABLET | Refills: 0 | Status: SHIPPED | OUTPATIENT
Start: 2018-05-09 | End: 2018-05-17

## 2018-05-09 RX ORDER — AMOXICILLIN 250 MG
1-2 CAPSULE ORAL 2 TIMES DAILY PRN
Qty: 60 TABLET | Refills: 1 | Status: SHIPPED | OUTPATIENT
Start: 2018-05-09 | End: 2018-05-17

## 2018-05-09 RX ORDER — FENTANYL CITRATE 50 UG/ML
25-50 INJECTION, SOLUTION INTRAMUSCULAR; INTRAVENOUS
Status: DISCONTINUED | OUTPATIENT
Start: 2018-05-09 | End: 2018-05-09 | Stop reason: HOSPADM

## 2018-05-09 RX ORDER — KETOROLAC TROMETHAMINE 30 MG/ML
INJECTION, SOLUTION INTRAMUSCULAR; INTRAVENOUS PRN
Status: DISCONTINUED | OUTPATIENT
Start: 2018-05-09 | End: 2018-05-09

## 2018-05-09 RX ORDER — NEOSTIGMINE METHYLSULFATE 1 MG/ML
VIAL (ML) INJECTION PRN
Status: DISCONTINUED | OUTPATIENT
Start: 2018-05-09 | End: 2018-05-09

## 2018-05-09 RX ORDER — MAGNESIUM HYDROXIDE 1200 MG/15ML
LIQUID ORAL PRN
Status: DISCONTINUED | OUTPATIENT
Start: 2018-05-09 | End: 2018-05-09 | Stop reason: HOSPADM

## 2018-05-09 RX ORDER — FENTANYL CITRATE 50 UG/ML
INJECTION, SOLUTION INTRAMUSCULAR; INTRAVENOUS PRN
Status: DISCONTINUED | OUTPATIENT
Start: 2018-05-09 | End: 2018-05-09

## 2018-05-09 RX ORDER — ONDANSETRON 2 MG/ML
INJECTION INTRAMUSCULAR; INTRAVENOUS PRN
Status: DISCONTINUED | OUTPATIENT
Start: 2018-05-09 | End: 2018-05-09

## 2018-05-09 RX ORDER — SODIUM CHLORIDE, SODIUM LACTATE, POTASSIUM CHLORIDE, CALCIUM CHLORIDE 600; 310; 30; 20 MG/100ML; MG/100ML; MG/100ML; MG/100ML
INJECTION, SOLUTION INTRAVENOUS CONTINUOUS PRN
Status: DISCONTINUED | OUTPATIENT
Start: 2018-05-09 | End: 2018-05-09

## 2018-05-09 RX ORDER — CEFAZOLIN SODIUM 2 G/100ML
2 INJECTION, SOLUTION INTRAVENOUS
Status: COMPLETED | OUTPATIENT
Start: 2018-05-09 | End: 2018-05-09

## 2018-05-09 RX ORDER — MEPERIDINE HYDROCHLORIDE 25 MG/ML
12.5 INJECTION INTRAMUSCULAR; INTRAVENOUS; SUBCUTANEOUS
Status: DISCONTINUED | OUTPATIENT
Start: 2018-05-09 | End: 2018-05-09 | Stop reason: HOSPADM

## 2018-05-09 RX ORDER — NALOXONE HYDROCHLORIDE 0.4 MG/ML
.1-.4 INJECTION, SOLUTION INTRAMUSCULAR; INTRAVENOUS; SUBCUTANEOUS
Status: DISCONTINUED | OUTPATIENT
Start: 2018-05-09 | End: 2018-05-09 | Stop reason: HOSPADM

## 2018-05-09 RX ORDER — ONDANSETRON 4 MG/1
4 TABLET, ORALLY DISINTEGRATING ORAL EVERY 30 MIN PRN
Status: DISCONTINUED | OUTPATIENT
Start: 2018-05-09 | End: 2018-05-09 | Stop reason: HOSPADM

## 2018-05-09 RX ORDER — GLYCOPYRROLATE 0.2 MG/ML
INJECTION, SOLUTION INTRAMUSCULAR; INTRAVENOUS PRN
Status: DISCONTINUED | OUTPATIENT
Start: 2018-05-09 | End: 2018-05-09

## 2018-05-09 RX ORDER — SODIUM CHLORIDE, SODIUM LACTATE, POTASSIUM CHLORIDE, CALCIUM CHLORIDE 600; 310; 30; 20 MG/100ML; MG/100ML; MG/100ML; MG/100ML
INJECTION, SOLUTION INTRAVENOUS CONTINUOUS
Status: DISCONTINUED | OUTPATIENT
Start: 2018-05-09 | End: 2018-05-09 | Stop reason: HOSPADM

## 2018-05-09 RX ORDER — HYDROCODONE BITARTRATE AND ACETAMINOPHEN 5; 325 MG/1; MG/1
1-2 TABLET ORAL
Status: COMPLETED | OUTPATIENT
Start: 2018-05-09 | End: 2018-05-09

## 2018-05-09 RX ORDER — ONDANSETRON 2 MG/ML
4 INJECTION INTRAMUSCULAR; INTRAVENOUS EVERY 30 MIN PRN
Status: DISCONTINUED | OUTPATIENT
Start: 2018-05-09 | End: 2018-05-09 | Stop reason: HOSPADM

## 2018-05-09 RX ORDER — DEXAMETHASONE SODIUM PHOSPHATE 4 MG/ML
INJECTION, SOLUTION INTRA-ARTICULAR; INTRALESIONAL; INTRAMUSCULAR; INTRAVENOUS; SOFT TISSUE PRN
Status: DISCONTINUED | OUTPATIENT
Start: 2018-05-09 | End: 2018-05-09

## 2018-05-09 RX ORDER — CEFAZOLIN SODIUM 1 G/3ML
1 INJECTION, POWDER, FOR SOLUTION INTRAMUSCULAR; INTRAVENOUS SEE ADMIN INSTRUCTIONS
Status: DISCONTINUED | OUTPATIENT
Start: 2018-05-09 | End: 2018-05-09 | Stop reason: HOSPADM

## 2018-05-09 RX ORDER — PROPOFOL 10 MG/ML
INJECTION, EMULSION INTRAVENOUS PRN
Status: DISCONTINUED | OUTPATIENT
Start: 2018-05-09 | End: 2018-05-09

## 2018-05-09 RX ADMIN — MIDAZOLAM 2 MG: 1 INJECTION INTRAMUSCULAR; INTRAVENOUS at 09:34

## 2018-05-09 RX ADMIN — DEXMEDETOMIDINE HYDROCHLORIDE 8 MCG: 100 INJECTION, SOLUTION INTRAVENOUS at 10:21

## 2018-05-09 RX ADMIN — ROCURONIUM BROMIDE 40 MG: 10 INJECTION INTRAVENOUS at 09:36

## 2018-05-09 RX ADMIN — LIDOCAINE HYDROCHLORIDE 23 ML: 10 INJECTION, SOLUTION EPIDURAL; INFILTRATION; INTRACAUDAL; PERINEURAL at 10:25

## 2018-05-09 RX ADMIN — SODIUM CHLORIDE, POTASSIUM CHLORIDE, SODIUM LACTATE AND CALCIUM CHLORIDE: 600; 310; 30; 20 INJECTION, SOLUTION INTRAVENOUS at 12:21

## 2018-05-09 RX ADMIN — DEXMEDETOMIDINE HYDROCHLORIDE 12 MCG: 100 INJECTION, SOLUTION INTRAVENOUS at 09:42

## 2018-05-09 RX ADMIN — DEXAMETHASONE SODIUM PHOSPHATE 4 MG: 4 INJECTION, SOLUTION INTRA-ARTICULAR; INTRALESIONAL; INTRAMUSCULAR; INTRAVENOUS; SOFT TISSUE at 10:00

## 2018-05-09 RX ADMIN — PROPOFOL 200 MG: 10 INJECTION, EMULSION INTRAVENOUS at 09:36

## 2018-05-09 RX ADMIN — GLYCOPYRROLATE 0.8 MG: 0.2 INJECTION, SOLUTION INTRAMUSCULAR; INTRAVENOUS at 10:25

## 2018-05-09 RX ADMIN — FENTANYL CITRATE 50 MCG: 50 INJECTION, SOLUTION INTRAMUSCULAR; INTRAVENOUS at 09:43

## 2018-05-09 RX ADMIN — ONDANSETRON 4 MG: 2 INJECTION INTRAMUSCULAR; INTRAVENOUS at 10:00

## 2018-05-09 RX ADMIN — KETOROLAC TROMETHAMINE 30 MG: 30 INJECTION, SOLUTION INTRAMUSCULAR at 10:17

## 2018-05-09 RX ADMIN — SODIUM CHLORIDE 1000 ML: 900 IRRIGANT IRRIGATION at 09:15

## 2018-05-09 RX ADMIN — BACITRACIN 1 TUBE: 500 OINTMENT TOPICAL at 10:12

## 2018-05-09 RX ADMIN — SODIUM CHLORIDE, POTASSIUM CHLORIDE, SODIUM LACTATE AND CALCIUM CHLORIDE: 600; 310; 30; 20 INJECTION, SOLUTION INTRAVENOUS at 09:34

## 2018-05-09 RX ADMIN — CEFAZOLIN SODIUM 2 G: 2 INJECTION, SOLUTION INTRAVENOUS at 09:38

## 2018-05-09 RX ADMIN — NEOSTIGMINE METHYLSULFATE 4 MG: 1 INJECTION, SOLUTION INTRAVENOUS at 10:25

## 2018-05-09 RX ADMIN — FENTANYL CITRATE 50 MCG: 50 INJECTION, SOLUTION INTRAMUSCULAR; INTRAVENOUS at 09:34

## 2018-05-09 RX ADMIN — LIDOCAINE HYDROCHLORIDE 100 MG: 20 INJECTION, SOLUTION INFILTRATION; PERINEURAL at 09:35

## 2018-05-09 RX ADMIN — HYDROCODONE BITARTRATE AND ACETAMINOPHEN 1 TABLET: 5; 325 TABLET ORAL at 12:35

## 2018-05-09 NOTE — DISCHARGE INSTRUCTIONS
Same Day Surgery Discharge Instructions for  Sedation and General Anesthesia       It's not unusual to feel dizzy, light-headed or faint for up to 24 hours after surgery or while taking pain medication.  If you have these symptoms: sit for a few minutes before standing and have someone assist you when you get up to walk or use the bathroom.      You should rest and relax for the next 24 hours. We recommend you make arrangements to have an adult stay with you for at least 24 hours after your discharge.  Avoid hazardous and strenuous activity.      DO NOT DRIVE any vehicle or operate mechanical equipment for 24 hours following the end of your surgery.  Even though you may feel normal, your reactions may be affected by the medication you have received.      Do not drink alcoholic beverages for 24 hours following surgery.       Slowly progress to your regular diet as you feel able. It's not unusual to feel nauseated and/or vomit after receiving anesthesia.  If you develop these symptoms, drink clear liquids (apple juice, ginger ale, broth, 7-up, etc. ) until you feel better.  If your nausea and vomiting persists for 24 hours, please notify your surgeon.        All narcotic pain medications, along with inactivity and anesthesia, can cause constipation. Drinking plenty of liquids and increasing fiber intake will help.      For any questions of a medical nature, call your surgeon.      Do not make important decisions for 24 hours.      If you had general anesthesia, you may have a sore throat for a couple of days related to the breathing tube used during surgery.  You may use Cepacol lozenges to help with this discomfort.  If it worsens or if you develop a fever, contact your surgeon.       If you feel your pain is not well managed with the pain medications prescribed by your surgeon, please contact your surgeon's office to let them know so they can address your concerns.       Today you received Toradol, an  antiinflammatory medication similar to Ibuprofen.  You should not take other antiinflammatory medication, such as Ibuprofen, Motrin, Advil, Aleve, Naprosyn, etc, until 4pm.

## 2018-05-09 NOTE — IP AVS SNAPSHOT
[place] [address]    [place] [address]    Phone:  [phone]                                       After Visit Summary   5/9/2018    Marilyn Perez    MRN: [id number]           After Visit Summary Signature Page     I have received my discharge instructions, and my questions have been answered. I have discussed any challenges I see with this plan with the nurse or doctor.    ..........................................................................................................................................  Patient/Patient Representative Signature      ..........................................................................................................................................  Patient Representative Print Name and Relationship to Patient    ..................................................               ................................................  Date                                            Time    ..........................................................................................................................................  Reviewed by Signature/Title    ...................................................              ..............................................  Date                                                            Time

## 2018-05-09 NOTE — ANESTHESIA POSTPROCEDURE EVALUATION
Patient: Marilyn Perez    Procedure(s):  PILONIDAL CLEFT LIFT  - Wound Class: II-Clean Contaminated    Diagnosis:PILONIDAL CYST   Diagnosis Additional Information: No value filed.    Anesthesia Type:  General    Note:  Anesthesia Post Evaluation    Patient location during evaluation: PACU  Patient participation: Able to fully participate in evaluation  Level of consciousness: awake  Pain management: adequate  Airway patency: patent  Cardiovascular status: acceptable  Respiratory status: acceptable  Hydration status: acceptable  PONV: none     Anesthetic complications: None          Last vitals:  Vitals:    05/09/18 1215 05/09/18 1230 05/09/18 1315   BP: 112/57  118/60   Resp: 21 20 16   Temp: 35.8  C (96.4  F) 35.9  C (96.6  F)    SpO2: 99% 97% 98%         Electronically Signed By: Nhi Lombardi  May 9, 2018  1:42 PM

## 2018-05-09 NOTE — ADDENDUM NOTE
Addendum  created 05/09/18 1511 by Nhi Lombardi    Anesthesia Review and Sign - Ready for Procedure, Anesthesia Review and Sign - Signed, Sign clinical note

## 2018-05-09 NOTE — BRIEF OP NOTE
[facility] Brief Operative Note    Pre-operative diagnosis: PILONIDAL CYST    Post-operative diagnosis Pilonidal cyst   Procedure: Procedure(s):  PILONIDAL CYSTECTOMY AND PILONIDAL CLEFT LIFT  - Wound Class: II-Clean Contaminated   Surgeon(s): Surgeon(s) and Role:     * Raffi Caraballo MD - Primary     * Shelby Jordan PA-C - Assisting   Estimated blood loss: 10 mL    Specimens:   ID Type Source Tests Collected by Time Destination   A : PILONIDAL CYST Tissue Buttock SURGICAL PATHOLOGY EXAM Raffi Caraballo MD 5/9/2018  9:52 AM       Findings: 1/4-inch penrose drain placed. No immediate complications. See operative report for full details.      Shelby Jordan PA-C  [facility]  [phone]

## 2018-05-09 NOTE — OP NOTE
Pre-Operative Diagnosis- Chronic pilonidal cyst     Post-Operative Diagnosis- Chronic pilonidal cyst     Procedure- 1- Excision of chronic pilonidal cyst                     2- Creation of 12 cm subcutaneous/skin flap with complex layered closure     Surgeon- Rashmi     Assistant-  Shelby Jordan PA-C     Anesthesia- GET     EBL- 5 cc    Indications-Mr. Perez presented with a chronic pilonidal cyst and is now presenting for Pilonidal cleft lift procedure. I described the risks, complications, including, but not limited to, bleeding, infection, hematoma/seroma, wound breakdown and need for packing, numbness, and recurrence. If any complications occurred the patient may require additional procedures. The patient agreed.         Procedure  The patient was brought to the operating room per anesthesia and intubated without difficulty. The patient was then flipped into the prone position with appropriate padding and prepped in usual sterile fashion with Betadine. A surgical timeout was performed verifying the correct surgeon, site, procedure, and patient and all in room were in agreement. The patient was previously preoperatively marked in a standing position showing the safety zone where the tissue came together without tension. The clefts and cyst spanned approximately 7 cm.  A skin incision was made including all of the pilonidal clefts. More skin was taken to the patient's left to aid in creation of the flap. The skin was then removed preserving as much underlying tissue as possible. This revealed multiple clefts that had hair and granulation tissue within them. These were scored with cautery. All hair was removed. A curet was used to break up the granulation tissue. A subcutaneous flap was then created to the patient's left spaning approximately 12 cm. This was done with sharp dissection and cautery was used for bleeding. There was no bleeding in the wound bed which was completely dry. The tapes were released and  showed that this would come together with no tension. The wound was then closed in multiple layers. The flap was closed starting with the deepest layer with multiple 2-0 PDS sutures in an interrupted fashion. The middle layered was closed with multiple 2-0 PDS in interrupted fashion. The final subdermal layer was closed with a 3-0 Vicryl in interrupted fashion. This helped create the flap and reduced the crevice very nicely. The skin was then approximated with multiple 3-0 nylon's in an interrupted mattress fashion. A small penrose drain was left in the subcutaneous tissue. The wound came together very nicely. The flap was completely pink and healthy. Site was dry with no visible bleeding. Patient tolerated the procedure well without complications. All sponge, instrument, and needle counts were correct at the conclusion of the case. A physician assistant was necessary for retraction, maintaining hemostasis and complex closure.        Raffi Caraballo M.D.  [facility]  [phone]     Please route or send letter to:  Primary Care Provider (PCP) and Referring Provider

## 2018-05-09 NOTE — ANESTHESIA CARE TRANSFER NOTE
Patient: Marilyn Perez    Procedure(s):  PILONIDAL CLEFT LIFT  - Wound Class: II-Clean Contaminated    Diagnosis: PILONIDAL CYST   Diagnosis Additional Information: No value filed.    Anesthesia Type:   General     Note:  Airway :Face Mask  Patient transferred to:PACU  Comments: Spontaneous respirations, tidal volume > 500, oxygen saturation > 97%, TOF 4/4 with > 5 seconds sustained tetany, follows commands.  Suctioned and extubated with cuff down to facemask.  Exchanging well.Transferred to PACU, spontaneous respirations, 10L oxygen via facemask.  All monitors and alarms on and functioning, VSS.  Patient awake, comfortable.  Report to PACU RN.Handoff Report: Identifed the Patient, Identified the Reponsible Provider, Reviewed the pertinent medical history, Discussed the surgical course, Reviewed Intra-OP anesthesia mangement and issues during anesthesia, Set expectations for post-procedure period and Allowed opportunity for questions and acknowledgement of understanding      Vitals: (Last set prior to Anesthesia Care Transfer)    CRNA VITALS  5/9/2018 1009 - 5/9/2018 1047      5/9/2018             Pulse: 72    SpO2: 97 %    Resp Rate (observed): 9                Electronically Signed By: MARIMAR Dorsey CRNA  May 9, 2018  10:47 AM

## 2018-05-09 NOTE — IP AVS SNAPSHOT
MRN:[id number]                      After Visit Summary   5/9/2018    Marilyn Perez    MRN: [id number]           Thank you!     Thank you for choosing [facility] for your care. Our goal is always to provide you with excellent care. Hearing back from our patients is one way we can continue to improve our services. Please take a few minutes to complete the written survey that you may receive in the mail after you visit with us. Thank you!        Patient Information     Date Of Birth          1995        About your hospital stay     You were admitted on:  May 9, 2018 You last received care in the:  [facility] PACU    You were discharged on:  May 9, 2018       Who to Call     For medical emergencies, please call 911.  For non-urgent questions about your medical care, please call your primary care provider or clinic, None  For questions related to your surgery, please call your surgery clinic        Attending Provider     Provider Raffi Mcgill MD Surgery       Primary Care Provider Fax #    Physician No Ref-Primary [phone]      After Care Instructions     Diet Instructions       Resume pre-procedure diet            Discharge Instructions       Please follow-up in 1 week (next Wednesday or Thursday) with Dr. Caraballo's physician assistant for removal of your drain. Call [phone] to schedule and you can ask to make an appointment with his PA or Dr. Caraballo. You will then return 1 week later (2 weeks from surgery) to see Dr. Caraballo for removal of the stitches.            Discharge Instructions       Pain medications can cause constipation.  Limit use when possible.  Take over the counter stool softener/stimulant prescribed to you (Senna-Docusate) 1-2 times a day with plenty of water.  You may take a mild over the counter laxative, such as Miralax or a suppository, as needed.  You may discontinue these medications once you are having regular bowel movements  and/or are no longer taking your narcotic pain medication.            Discharge Instructions       CALL OUR OFFICE -[phone] IF YOU HAVE:   Chills or fever above 101 F.  Increased redness, warmth, or drainage at your incisions.  Significant bleeding.  Pain not relieved by your pain medication or rest.  Increasing pain after the first 48 hours.  Any other concerns or questions.            Dressing       Change the dressing when you shower or as needed for drainage.            No Alcohol       For 24 hours post procedure            No driving or operating machinery        until the day after procedure            Shower       No shower for 24 hours post procedure. May shower on Thursday. Remove the outer dressings and allow warm water and soap to run over the incision. Always pat the incision dry. You can put antibiotic ointment over the incision, then cover with gauze and tape to collect the drainage.            Weight bearing status - As tolerated                 Further instructions from your care team       Same Day Surgery Discharge Instructions for  Sedation and General Anesthesia       It's not unusual to feel dizzy, light-headed or faint for up to 24 hours after surgery or while taking pain medication.  If you have these symptoms: sit for a few minutes before standing and have someone assist you when you get up to walk or use the bathroom.      You should rest and relax for the next 24 hours. We recommend you make arrangements to have an adult stay with you for at least 24 hours after your discharge.  Avoid hazardous and strenuous activity.      DO NOT DRIVE any vehicle or operate mechanical equipment for 24 hours following the end of your surgery.  Even though you may feel normal, your reactions may be affected by the medication you have received.      Do not drink alcoholic beverages for 24 hours following surgery.       Slowly progress to your regular diet as you feel able. It's not unusual to feel  "nauseated and/or vomit after receiving anesthesia.  If you develop these symptoms, drink clear liquids (apple juice, ginger ale, broth, 7-up, etc. ) until you feel better.  If your nausea and vomiting persists for 24 hours, please notify your surgeon.        All narcotic pain medications, along with inactivity and anesthesia, can cause constipation. Drinking plenty of liquids and increasing fiber intake will help.      For any questions of a medical nature, call your surgeon.      Do not make important decisions for 24 hours.      If you had general anesthesia, you may have a sore throat for a couple of days related to the breathing tube used during surgery.  You may use Cepacol lozenges to help with this discomfort.  If it worsens or if you develop a fever, contact your surgeon.       If you feel your pain is not well managed with the pain medications prescribed by your surgeon, please contact your surgeon's office to let them know so they can address your concerns.       Today you received Toradol, an antiinflammatory medication similar to Ibuprofen.  You should not take other antiinflammatory medication, such as Ibuprofen, Motrin, Advil, Aleve, Naprosyn, etc, until 4pm.               Pending Results     Date and Time Order Name Status Description    5/9/2018 0959 Surgical pathology exam In process             Admission Information     Date & Time Provider Department Dept. Phone    5/9/2018 Raffi Caraballo MD [facility] PACU [phone]      Your Vitals Were     Blood Pressure Temperature Respirations Height Weight Pulse Oximetry    112/57 96.4  F (35.8  C) 21 1.74 m (5' 8.5\") 83.6 kg (184 lb 4.8 oz) 99%    BMI (Body Mass Index)                   27.62 kg/m2           [employer] Information     [employer] lets you send messages to your doctor, view your test results, renew your prescriptions, schedule appointments and more. To sign up, go to [email or web address] . Click on \"Log in\" on the left side of " "the screen, which will take you to the Welcome page. Then click on \"Sign up Now\" on the right side of the page.     You will be asked to enter the access code listed below, as well as some personal information. Please follow the directions to create your username and password.     Your access code is: [email or web address]  Expires: 2018  2:03 PM     Your access code will  in 90 days. If you need help or a new code, please call your [place] clinic or [phone].        Care EveryWhere ID     This is your Care EveryWhere ID. This could be used by other organizations to access your [place] medical records  [id number]        Equal Access to Services     [facility] [place] : Jerry Bernal, venancio chase, max marsh, bernice bell. So [facility] [phone].    ATENCIÓN: Si habla español, tiene a zavala disposición servicios gratuitos de asistencia lingüística. Llame al [phone].    We comply with applicable federal civil rights laws and [place] laws. We do not discriminate on the basis of race, color, national origin, age, disability, sex, sexual orientation, or gender identity.               Review of your medicines      START taking        Dose / Directions    HYDROcodone-acetaminophen 5-325 MG per tablet   Commonly known as:  NORCO   Used for:  Pilonidal cyst   Notes to Patient:  One pain pill taken at 12:35 pm        Dose:  1-2 tablet   Take 1-2 tablets by mouth every 4 hours as needed for other (Moderate to Severe Pain)   Quantity:  20 tablet   Refills:  0       senna-docusate 8.6-50 MG per tablet   Commonly known as:  SENOKOT-S;PERICOLACE   Used for:  Pilonidal cyst        Dose:  1-2 tablet   Take 1-2 tablets by mouth 2 times daily as needed for constipation   Quantity:  60 tablet   Refills:  1         CONTINUE these medicines which have NOT CHANGED        Dose / Directions    cephALEXin 500 MG capsule   Commonly known as:  KEFLEX   Used for:  Pilonidal " abscess        Dose:  500 mg   Take 1 capsule (500 mg) by mouth 3 times daily   Quantity:  30 capsule   Refills:  0            Where to get your medicines      These medications were sent to [facility] [place], [place] - [address]  [address], [place] [address]     Phone:  [phone]     senna-docusate 8.6-50 MG per tablet         Some of these will need a paper prescription and others can be bought over the counter. Ask your nurse if you have questions.     Bring a paper prescription for each of these medications     HYDROcodone-acetaminophen 5-325 MG per tablet                Protect others around you: Learn how to safely use, store and throw away your medicines at www.disposemymeds.org.        Information about OPIOIDS     PRESCRIPTION OPIOIDS: WHAT YOU NEED TO KNOW   You have a prescription for an opioid (narcotic) pain medicine. Opioids can cause addiction. If you have a history of chemical dependency of any type, you are at a higher risk of becoming addicted to opioids. Only take this medicine after all other options have been tried. Take it for as short a time and as few doses as possible.     Do not:    Drive. If you drive while taking these medicines, you could be arrested for driving under the influence (DUI).    Operate heavy machinery    Do any other dangerous activities while taking these medicines.     Drink any alcohol while taking these medicines.      Take with any other medicines that contain acetaminophen. Read all labels carefully. Look for the word  acetaminophen  or  Tylenol.  Ask your pharmacist if you have questions or are unsure.    Store your pills in a secure place, locked if possible. We will not replace any lost or stolen medicine. If you don t finish your medicine, please throw away (dispose) as directed by your pharmacist. The [place] Pollution Control Agency has more information about safe disposal:  https://www.pca.[facility]/living-green/managing-unwanted-medications    All opioids tend to cause constipation. Drink plenty of water and eat foods that have a lot of fiber, such as fruits, vegetables, prune juice, apple juice and high-fiber cereal. Take a laxative (Miralax, milk of magnesia, Colace, Senna) if you don t move your bowels at least every other day.              Medication List: This is a list of all your medications and when to take them. Check marks below indicate your daily home schedule. Keep this list as a reference.      Medications           Morning Afternoon Evening Bedtime As Needed    cephALEXin 500 MG capsule   Commonly known as:  KEFLEX   Take 1 capsule (500 mg) by mouth 3 times daily                                HYDROcodone-acetaminophen 5-325 MG per tablet   Commonly known as:  NORCO   Take 1-2 tablets by mouth every 4 hours as needed for other (Moderate to Severe Pain)   Last time this was given:  1 tablet on 5/9/2018 12:35 PM   Notes to Patient:  One pain pill taken at 12:35 pm                                senna-docusate 8.6-50 MG per tablet   Commonly known as:  SENOKOT-S;PERICOLACE   Take 1-2 tablets by mouth 2 times daily as needed for constipation

## 2018-05-10 LAB — COPATH REPORT: NORMAL

## 2018-05-17 ENCOUNTER — OFFICE VISIT (OUTPATIENT)
Dept: SURGERY | Facility: CLINIC | Age: 23
End: 2018-05-17
Payer: COMMERCIAL

## 2018-05-17 DIAGNOSIS — Z09 SURGERY FOLLOW-UP EXAMINATION: Primary | ICD-10-CM

## 2018-05-17 DIAGNOSIS — L05.91 PILONIDAL CYST: ICD-10-CM

## 2018-05-17 PROCEDURE — 99024 POSTOP FOLLOW-UP VISIT: CPT | Performed by: SURGERY

## 2018-05-17 RX ORDER — HYDROCODONE BITARTRATE AND ACETAMINOPHEN 5; 325 MG/1; MG/1
1-2 TABLET ORAL EVERY 4 HOURS PRN
Qty: 20 TABLET | Refills: 0 | Status: SHIPPED | OUTPATIENT
Start: 2018-05-17

## 2018-05-17 NOTE — MR AVS SNAPSHOT
"              After Visit Summary   2018    Marilyn Perez    MRN: [id number]           Patient Information     Date Of Birth          1995        Visit Information        Provider Department      2018 8:45 AM Raffi Caraballo MD [facility]      Today's Diagnoses     Surgery follow-up examination    -  1    Pilonidal cyst           Follow-ups after your visit        Who to contact     If you have questions or need follow up information about today's clinic visit or your schedule please contact [facility] directly at [phone].  Normal or non-critical lab and imaging results will be communicated to you by [employer], letter or phone within 4 business days after the clinic has received the results. If you do not hear from us within 7 days, please contact the clinic through [employer] or phone. If you have a critical or abnormal lab result, we will notify you by phone as soon as possible.  Submit refill requests through [employer] or call your pharmacy and they will forward the refill request to us. Please allow 3 business days for your refill to be completed.          Additional Information About Your Visit        MyChart Information     [employer] lets you send messages to your doctor, view your test results, renew your prescriptions, schedule appointments and more. To sign up, go to [email or web address] . Click on \"Log in\" on the left side of the screen, which will take you to the Welcome page. Then click on \"Sign up Now\" on the right side of the page.     You will be asked to enter the access code listed below, as well as some personal information. Please follow the directions to create your username and password.     Your access code is: [email or web address]  Expires: 2018  2:03 PM     Your access code will  in 90 days. If you need help or a new code, please call your [place] clinic or [phone].        Care " EveryWhere ID     This is your Care EveryWhere ID. This could be used by other organizations to access your [place] medical records  [id number]         Blood Pressure from Last 3 Encounters:   05/09/18 118/60   04/26/18 120/80   04/22/18 152/72    Weight from Last 3 Encounters:   05/09/18 184 lb 4.8 oz (83.6 kg)   04/26/18 184 lb (83.5 kg)   04/22/18 182 lb (82.6 kg)              Today, you had the following     No orders found for display         Where to get your medicines      Some of these will need a paper prescription and others can be bought over the counter.  Ask your nurse if you have questions.     Bring a paper prescription for each of these medications     HYDROcodone-acetaminophen 5-325 MG per tablet         Information about OPIOIDS     PRESCRIPTION OPIOIDS: WHAT YOU NEED TO KNOW   You have a prescription for an opioid (narcotic) pain medicine. Opioids can cause addiction. If you have a history of chemical dependency of any type, you are at a higher risk of becoming addicted to opioids. Only take this medicine after all other options have been tried. Take it for as short a time and as few doses as possible.     Do not:    Drive. If you drive while taking these medicines, you could be arrested for driving under the influence (DUI).    Operate heavy machinery    Do any other dangerous activities while taking these medicines.     Drink any alcohol while taking these medicines.      Take with any other medicines that contain acetaminophen. Read all labels carefully. Look for the word  acetaminophen  or  Tylenol.  Ask your pharmacist if you have questions or are unsure.    Store your pills in a secure place, locked if possible. We will not replace any lost or stolen medicine. If you don t finish your medicine, please throw away (dispose) as directed by your pharmacist. The [place] Pollution Control Agency has more information about safe disposal:  https://www.pca.[facility].mn.us/living-green/managing-unwanted-medications    All opioids tend to cause constipation. Drink plenty of water and eat foods that have a lot of fiber, such as fruits, vegetables, prune juice, apple juice and high-fiber cereal. Take a laxative (Miralax, milk of magnesia, Colace, Senna) if you don t move your bowels at least every other day.          Primary Care Provider Fax #    Physician No Ref-Primary [phone]       No address on file        Equal Access to Services     [place] : Jerry Bernal, waaxda luqadaha, qaybta kaalmatuan marsh, bernice ramirez . So [facility] [phone].    ATENCIÓN: Si habla español, tiene a zavala disposición servicios gratuitos de asistencia lingüística. Llame al [phone].    We comply with applicable federal civil rights laws and [place] laws. We do not discriminate on the basis of race, color, national origin, age, disability, sex, sexual orientation, or gender identity.            Thank you!     Thank you for choosing [facility]  for your care. Our goal is always to provide you with excellent care. Hearing back from our patients is one way we can continue to improve our services. Please take a few minutes to complete the written survey that you may receive in the mail after your visit with us. Thank you!             Your Updated Medication List - Protect others around you: Learn how to safely use, store and throw away your medicines at www.disposemymeds.org.          This list is accurate as of 5/17/18  8:57 AM.  Always use your most recent med list.                   Brand Name Dispense Instructions for use Diagnosis    HYDROcodone-acetaminophen 5-325 MG per tablet    NORCO    20 tablet    Take 1-2 tablets by mouth every 4 hours as needed for other (Moderate to Severe Pain)    Pilonidal cyst

## 2018-05-17 NOTE — PROGRESS NOTES
Surgery Postoperative Note    Doing well. Tolerating diet. Having regular Bowel movements. Pain controlled with occasional narcotics. Minimal drainage. No other complaints.    Buttock-wound healing well. Drain easily removed. No wound breakdown.     A/P  Marilyn Perez is recovering well from a pilonidal cleft lift. Drain easily removed. Wound is healing without issues.return in one week for suture removal.    Thank you for the opportunity to help in his care.    Raffi Caraballo M.D.  [facility]  [phone]    Please route or send letter to:  Primary Care Provider (PCP) and Referring Provider

## 2018-05-25 ENCOUNTER — OFFICE VISIT (OUTPATIENT)
Dept: SURGERY | Facility: CLINIC | Age: 23
End: 2018-05-25
Payer: COMMERCIAL

## 2018-05-25 DIAGNOSIS — Z09 SURGERY FOLLOW-UP EXAMINATION: Primary | ICD-10-CM

## 2018-05-25 DIAGNOSIS — L05.91 PILONIDAL CYST: ICD-10-CM

## 2018-05-25 PROCEDURE — 99024 POSTOP FOLLOW-UP VISIT: CPT | Performed by: PHYSICIAN ASSISTANT

## 2018-05-25 RX ORDER — OXYCODONE AND ACETAMINOPHEN 5; 325 MG/1; MG/1
1-2 TABLET ORAL EVERY 6 HOURS PRN
Qty: 18 TABLET | Refills: 0 | Status: SHIPPED | OUTPATIENT
Start: 2018-05-25

## 2018-05-25 NOTE — MR AVS SNAPSHOT
"              After Visit Summary   2018    Marilyn Perez    MRN: [id number]           Patient Information     Date Of Birth          1995        Visit Information        Provider Department      2018 9:00 AM Nneka Loredo PA-C [facility] General Surgery      Today's Diagnoses     Surgery follow-up examination    -  1    Pilonidal cyst           Follow-ups after your visit        Who to contact     If you have questions or need follow up information about today's clinic visit or your schedule please contact [facility] directly at [phone].  Normal or non-critical lab and imaging results will be communicated to you by [employer], letter or phone within 4 business days after the clinic has received the results. If you do not hear from us within 7 days, please contact the clinic through [employer] or phone. If you have a critical or abnormal lab result, we will notify you by phone as soon as possible.  Submit refill requests through [employer] or call your pharmacy and they will forward the refill request to us. Please allow 3 business days for your refill to be completed.          Additional Information About Your Visit        MyChart Information     [employer] lets you send messages to your doctor, view your test results, renew your prescriptions, schedule appointments and more. To sign up, go to [email or web address] . Click on \"Log in\" on the left side of the screen, which will take you to the Welcome page. Then click on \"Sign up Now\" on the right side of the page.     You will be asked to enter the access code listed below, as well as some personal information. Please follow the directions to create your username and password.     Your access code is: [email or web address]  Expires: 2018  2:03 PM     Your access code will  in 90 days. If you need help or a new code, please call your [place] clinic or [phone].        Care " EveryWhere ID     This is your Care EveryWhere ID. This could be used by other organizations to access your [place] medical records  [id number]         Blood Pressure from Last 3 Encounters:   05/09/18 118/60   04/26/18 120/80   04/22/18 152/72    Weight from Last 3 Encounters:   05/09/18 83.6 kg (184 lb 4.8 oz)   04/26/18 83.5 kg (184 lb)   04/22/18 82.6 kg (182 lb)              Today, you had the following     No orders found for display         Today's Medication Changes          These changes are accurate as of 5/25/18  9:21 AM.  If you have any questions, ask your nurse or doctor.               Start taking these medicines.        Dose/Directions    oxyCODONE-acetaminophen 5-325 MG per tablet   Commonly known as:  PERCOCET   Used for:  Surgery follow-up examination, Pilonidal cyst   Started by:  Nneka Loredo PA-C        Dose:  1-2 tablet   Take 1-2 tablets by mouth every 6 hours as needed for pain   Quantity:  18 tablet   Refills:  0            Where to get your medicines      Some of these will need a paper prescription and others can be bought over the counter.  Ask your nurse if you have questions.     Bring a paper prescription for each of these medications     oxyCODONE-acetaminophen 5-325 MG per tablet               Information about OPIOIDS     PRESCRIPTION OPIOIDS: WHAT YOU NEED TO KNOW   You have a prescription for an opioid (narcotic) pain medicine. Opioids can cause addiction. If you have a history of chemical dependency of any type, you are at a higher risk of becoming addicted to opioids. Only take this medicine after all other options have been tried. Take it for as short a time and as few doses as possible.     Do not:    Drive. If you drive while taking these medicines, you could be arrested for driving under the influence (DUI).    Operate heavy machinery    Do any other dangerous activities while taking these medicines.     Drink any alcohol while taking these medicines.       Take with any other medicines that contain acetaminophen. Read all labels carefully. Look for the word  acetaminophen  or  Tylenol.  Ask your pharmacist if you have questions or are unsure.    Store your pills in a secure place, locked if possible. We will not replace any lost or stolen medicine. If you don t finish your medicine, please throw away (dispose) as directed by your pharmacist. The [place] Pollution Control Agency has more information about safe disposal: https://www.pca.[facility].mn.us/living-green/managing-unwanted-medications    All opioids tend to cause constipation. Drink plenty of water and eat foods that have a lot of fiber, such as fruits, vegetables, prune juice, apple juice and high-fiber cereal. Take a laxative (Miralax, milk of magnesia, Colace, Senna) if you don t move your bowels at least every other day.          Primary Care Provider Fax #    Physician No Ref-Primary [phone]       No address on file        Equal Access to Services     [place] : Hadradha Bernal, waaxda lujeffrey, qaybta kaalmada salma, bernice ramirez . So [facility] [phone].    ATENCIÓN: Si habla español, tiene a zavala disposición servicios gratuitos de asistencia lingüística. Llame al [phone].    We comply with applicable federal civil rights laws and [place] laws. We do not discriminate on the basis of race, color, national origin, age, disability, sex, sexual orientation, or gender identity.            Thank you!     Thank you for choosing [facility]  for your care. Our goal is always to provide you with excellent care. Hearing back from our patients is one way we can continue to improve our services. Please take a few minutes to complete the written survey that you may receive in the mail after your visit with us. Thank you!             Your Updated Medication List - Protect others around you: Learn how to safely use, store and throw away your  medicines at www.disposemymeds.org.          This list is accurate as of 5/25/18  9:21 AM.  Always use your most recent med list.                   Brand Name Dispense Instructions for use Diagnosis    HYDROcodone-acetaminophen 5-325 MG per tablet    NORCO    20 tablet    Take 1-2 tablets by mouth every 4 hours as needed for other (Moderate to Severe Pain)    Pilonidal cyst       oxyCODONE-acetaminophen 5-325 MG per tablet    PERCOCET    18 tablet    Take 1-2 tablets by mouth every 6 hours as needed for pain    Surgery follow-up examination, Pilonidal cyst

## 2018-05-25 NOTE — PROGRESS NOTES
[facility] Note     Subjective:  Marilyn Perez is here for his first postoperative visit. He underwent a pilonidal cystectomy by Dr. Caraballo on May / 09 / 2018. Today he tells me he is doing well.  He has some very mild drainage.  He reports having increased pain in the evenings since he was last seen.  He has been using 3 Norco tabs with little effect.  He finished his antibiotics yesterday.      Objective:  Buttock cleft - sutures removed intact.  Area appears to be well healed with no drainage.  Very scant amount of sero sang drainage was expressed from suture holes.  Inferior aspect of wound is still healing    Assessment:  S/p pilonidal cystectomy.     Plan:  Patient given new Rx for percocet, did advise pt that most likely his pain will improve Patient concerned about area opening again or ongoing drainage, will have pt follow up with Dr Caraballo next week. Patient was instructed to call if fever, increasing pain, redness or drainage at the incision sites occurs.      Nneka Loredo PA-C    Please route or send letter to:  *None*

## (undated) DEVICE — SUCTION CANISTER MEDIVAC LINER 3000ML W/LID 65651-530

## (undated) DEVICE — GOWN IMPERVIOUS SPECIALTY XLG/XLONG 32474

## (undated) DEVICE — SU DEVICE ENDO COR KNOT QUICK LOAD 030850

## (undated) DEVICE — SU PDS II 2-0 CT-2 27"  Z333H

## (undated) DEVICE — DRAPE MINOR PROCEDURE LAP 29496

## (undated) DEVICE — SOL NACL 0.9% IRRIG 1000ML BOTTLE 07138-09

## (undated) DEVICE — GLOVE PROTEXIS W/NEU-THERA 7.5  2D73TE75

## (undated) DEVICE — ESU ELEC BLADE 2.75" COATED/INSULATED E1455

## (undated) DEVICE — SPONGE BALL KERLIX ROUND XL W/O STRING LATEX 4935

## (undated) DEVICE — LINEN TOWEL PACK X5 5464

## (undated) DEVICE — SU VICRYL 3-0 SH 27" J316H

## (undated) DEVICE — SUCTION TIP YANKAUER W/O VENT K86

## (undated) DEVICE — DRAIN PENROSE 0.25"X18" LATEX FREE GR201

## (undated) DEVICE — GLOVE GAMMEX DERMAPRENE ULTRA SZ 8 LF 8516

## (undated) DEVICE — SOL WATER IRRIG 1000ML BOTTLE 2F7114

## (undated) DEVICE — NDL 19GA 1.5"

## (undated) DEVICE — PACK MINOR SBA15MIFSE

## (undated) DEVICE — SU ETHILON 2-0 FS 18" 664H

## (undated) DEVICE — SYR BULB IRRIG 50ML LATEX FREE 0035280

## (undated) RX ORDER — PROPOFOL 10 MG/ML
INJECTION, EMULSION INTRAVENOUS
Status: DISPENSED
Start: 2018-05-09

## (undated) RX ORDER — FENTANYL CITRATE 50 UG/ML
INJECTION, SOLUTION INTRAMUSCULAR; INTRAVENOUS
Status: DISPENSED
Start: 2018-05-09

## (undated) RX ORDER — ONDANSETRON 2 MG/ML
INJECTION INTRAMUSCULAR; INTRAVENOUS
Status: DISPENSED
Start: 2018-05-09

## (undated) RX ORDER — HYDROCODONE BITARTRATE AND ACETAMINOPHEN 5; 325 MG/1; MG/1
TABLET ORAL
Status: DISPENSED
Start: 2018-05-09

## (undated) RX ORDER — GLYCOPYRROLATE 0.2 MG/ML
INJECTION, SOLUTION INTRAMUSCULAR; INTRAVENOUS
Status: DISPENSED
Start: 2018-05-09

## (undated) RX ORDER — EPINEPHRINE 1 MG/ML
INJECTION, SOLUTION INTRAMUSCULAR; SUBCUTANEOUS
Status: DISPENSED
Start: 2018-05-09

## (undated) RX ORDER — DEXAMETHASONE SODIUM PHOSPHATE 4 MG/ML
INJECTION, SOLUTION INTRA-ARTICULAR; INTRALESIONAL; INTRAMUSCULAR; INTRAVENOUS; SOFT TISSUE
Status: DISPENSED
Start: 2018-05-09

## (undated) RX ORDER — BUPIVACAINE HYDROCHLORIDE 2.5 MG/ML
INJECTION, SOLUTION EPIDURAL; INFILTRATION; INTRACAUDAL
Status: DISPENSED
Start: 2018-05-09

## (undated) RX ORDER — LIDOCAINE HYDROCHLORIDE 10 MG/ML
INJECTION, SOLUTION EPIDURAL; INFILTRATION; INTRACAUDAL; PERINEURAL
Status: DISPENSED
Start: 2018-05-09

## (undated) RX ORDER — CEFAZOLIN SODIUM 2 G/100ML
INJECTION, SOLUTION INTRAVENOUS
Status: DISPENSED
Start: 2018-05-09

## (undated) RX ORDER — GINSENG 100 MG
CAPSULE ORAL
Status: DISPENSED
Start: 2018-05-09

## (undated) RX ORDER — LIDOCAINE HYDROCHLORIDE 20 MG/ML
INJECTION, SOLUTION EPIDURAL; INFILTRATION; INTRACAUDAL; PERINEURAL
Status: DISPENSED
Start: 2018-05-09

## (undated) RX ORDER — NEOSTIGMINE METHYLSULFATE 1 MG/ML
VIAL (ML) INJECTION
Status: DISPENSED
Start: 2018-05-09

## (undated) RX ORDER — KETOROLAC TROMETHAMINE 30 MG/ML
INJECTION, SOLUTION INTRAMUSCULAR; INTRAVENOUS
Status: DISPENSED
Start: 2018-05-09